# Patient Record
Sex: FEMALE | Race: WHITE
[De-identification: names, ages, dates, MRNs, and addresses within clinical notes are randomized per-mention and may not be internally consistent; named-entity substitution may affect disease eponyms.]

---

## 2020-12-08 ENCOUNTER — HOSPITAL ENCOUNTER (OUTPATIENT)
Dept: HOSPITAL 96 - M.CT | Age: 50
End: 2020-12-08
Attending: INTERNAL MEDICINE
Payer: COMMERCIAL

## 2020-12-08 VITALS — SYSTOLIC BLOOD PRESSURE: 140 MMHG | DIASTOLIC BLOOD PRESSURE: 82 MMHG

## 2020-12-08 VITALS — BODY MASS INDEX: 27.28 KG/M2 | HEIGHT: 68 IN | WEIGHT: 180 LBS

## 2020-12-08 VITALS — SYSTOLIC BLOOD PRESSURE: 134 MMHG | DIASTOLIC BLOOD PRESSURE: 78 MMHG

## 2020-12-08 VITALS — DIASTOLIC BLOOD PRESSURE: 78 MMHG | SYSTOLIC BLOOD PRESSURE: 123 MMHG

## 2020-12-08 VITALS — SYSTOLIC BLOOD PRESSURE: 123 MMHG | DIASTOLIC BLOOD PRESSURE: 72 MMHG

## 2020-12-08 DIAGNOSIS — Z88.1: ICD-10-CM

## 2020-12-08 DIAGNOSIS — R07.9: Primary | ICD-10-CM

## 2020-12-08 DIAGNOSIS — Z88.5: ICD-10-CM

## 2020-12-08 DIAGNOSIS — Z88.6: ICD-10-CM

## 2020-12-08 LAB
BUN SERPL-MCNC: 32 MG/DL (ref 7–18)
CREAT SERPL-MCNC: 0.8 MG/DL (ref 0.6–1.3)

## 2020-12-11 ENCOUNTER — HOSPITAL ENCOUNTER (OUTPATIENT)
Dept: HOSPITAL 96 - M.CL | Age: 50
Discharge: HOME | End: 2020-12-11
Attending: INTERNAL MEDICINE
Payer: COMMERCIAL

## 2020-12-11 VITALS — SYSTOLIC BLOOD PRESSURE: 117 MMHG | DIASTOLIC BLOOD PRESSURE: 71 MMHG

## 2020-12-11 DIAGNOSIS — R55: Primary | ICD-10-CM

## 2020-12-11 DIAGNOSIS — Z79.899: ICD-10-CM

## 2020-12-11 DIAGNOSIS — Z79.01: ICD-10-CM

## 2020-12-11 DIAGNOSIS — R00.1: ICD-10-CM

## 2020-12-11 DIAGNOSIS — Z88.8: ICD-10-CM

## 2020-12-11 DIAGNOSIS — Z98.890: ICD-10-CM

## 2020-12-15 NOTE — CARD
[address]  [address]                    CARDIAC CATH REPORT           
_______________________________________________________________________________
 
Name:       CHRISSY BUTCHER                Room:                      REG CLI 
M.R.#:  [id number]      Account #:      [id number]  
Admission:  12/11/20     Attend Phys:    Derek Kowalski MD
Discharge:               Date of Birth:  [birth year]  
         Report #: [id number]
                                                                     [id number]  
_______________________________________________________________________________
THIS REPORT FOR:  
 
cc:  Kirit Leblanc Russell J. DO                                               ~
     Derek Kowalski MD [facility]     
 
DATE OF SERVICE:  12/11/2020
 
 
PROCEDURE:  Implantable loop recorder placement.
 
INDICATION:  Bradycardia and near syncope.
 
DESCRIPTION OF PROCEDURE:  After informed consent was obtained, the patient was
placed on the gurney in the cardiac holding area.  The anterior chest was
prepped and draped in sterile fashion.  The third intercostal space, left of
sternum was anesthetized with 1% lidocaine.  Once the patient was adequately
anesthetized, an incision was made in the third intercostal space.  The
Biotronik implantable loop recorder was placed utilizing the designated injector
without complication.  The skin incision was then closed with Dermabond.  The
patient tolerated the procedure well and without complication.
 
Implanted device is a BioMonitor IIIm, serial #[id number].
 
IMPRESSION:
1.  Bradycardia and near syncope.
2.  Successful placement of an implantable loop recorder as outlined above.
 
RECOMMENDATIONS:  Followup site check in 1 week.
 
 
 
 
 
 
 
 
 
 
 
 
 
 
 
<ELECTRONICALLY SIGNED>
                                        By:  Derek Kowalski MD, [facility]   
12/15/20     0903
D: 12/11/20 1704_______________________________________
T: 12/11/20 1814Derek Kowalski MD, FACC      /nt

## 2021-09-30 ENCOUNTER — HOSPITAL ENCOUNTER (EMERGENCY)
Dept: HOSPITAL 96 - M.ERS | Age: 51
Discharge: HOME | End: 2021-09-30
Payer: COMMERCIAL

## 2021-09-30 VITALS — BODY MASS INDEX: 25.61 KG/M2 | WEIGHT: 169.01 LBS | HEIGHT: 68 IN

## 2021-09-30 VITALS — DIASTOLIC BLOOD PRESSURE: 72 MMHG | SYSTOLIC BLOOD PRESSURE: 101 MMHG

## 2021-09-30 DIAGNOSIS — Z90.49: ICD-10-CM

## 2021-09-30 DIAGNOSIS — Z88.8: ICD-10-CM

## 2021-09-30 DIAGNOSIS — Z79.1: ICD-10-CM

## 2021-09-30 DIAGNOSIS — Z88.1: ICD-10-CM

## 2021-09-30 DIAGNOSIS — Z88.5: ICD-10-CM

## 2021-09-30 DIAGNOSIS — Z98.890: ICD-10-CM

## 2021-09-30 DIAGNOSIS — Z79.891: ICD-10-CM

## 2021-09-30 DIAGNOSIS — K59.00: Primary | ICD-10-CM

## 2021-09-30 DIAGNOSIS — Z79.899: ICD-10-CM

## 2021-09-30 DIAGNOSIS — Z91.018: ICD-10-CM

## 2021-09-30 DIAGNOSIS — Z88.6: ICD-10-CM

## 2021-09-30 DIAGNOSIS — R10.84: ICD-10-CM

## 2021-09-30 LAB
ABSOLUTE BASOPHILS: 0 THOU/UL (ref 0–0.2)
ABSOLUTE EOSINOPHILS: 0.1 THOU/UL (ref 0–0.7)
ABSOLUTE MONOCYTES: 0.3 THOU/UL (ref 0–1.2)
ALBUMIN SERPL-MCNC: 3.3 G/DL (ref 3.4–5)
ALP SERPL-CCNC: 88 U/L (ref 46–116)
ALT SERPL-CCNC: 27 U/L (ref 30–65)
ANION GAP SERPL CALC-SCNC: 4 MMOL/L (ref 7–16)
AST SERPL-CCNC: 22 U/L (ref 15–37)
BASOPHILS NFR BLD AUTO: 0.7 %
BILIRUB SERPL-MCNC: 0.1 MG/DL
BILIRUB UR-MCNC: NEGATIVE MG/DL
BUN SERPL-MCNC: 26 MG/DL (ref 7–18)
CALCIUM SERPL-MCNC: 8.4 MG/DL (ref 8.5–10.1)
CHLORIDE SERPL-SCNC: 106 MMOL/L (ref 98–107)
CO2 SERPL-SCNC: 30 MMOL/L (ref 21–32)
COLOR UR: YELLOW
CREAT SERPL-MCNC: 0.9 MG/DL (ref 0.6–1.3)
EOSINOPHIL NFR BLD: 1.9 %
GLUCOSE SERPL-MCNC: 93 MG/DL (ref 70–99)
GRANULOCYTES NFR BLD MANUAL: 65.9 %
HCT VFR BLD CALC: 31 % (ref 37–47)
HGB BLD-MCNC: 10 GM/DL (ref 12–15)
KETONES UR STRIP-MCNC: NEGATIVE MG/DL
LIPASE: 98 U/L (ref 73–393)
LYMPHOCYTES # BLD: 1.1 THOU/UL (ref 0.8–5.3)
LYMPHOCYTES NFR BLD AUTO: 25.6 %
MCH RBC QN AUTO: 25.3 PG (ref 26–34)
MCHC RBC AUTO-ENTMCNC: 32.4 G/DL (ref 28–37)
MCV RBC: 78.1 FL (ref 80–100)
MONOCYTES NFR BLD: 5.9 %
MPV: 7.2 FL. (ref 7.2–11.1)
NEUTROPHILS # BLD: 2.9 THOU/UL (ref 1.6–8.1)
NUCLEATED RBCS: 0 /100WBC
PLATELET COUNT*: 210 THOU/UL (ref 150–400)
POTASSIUM SERPL-SCNC: 4.1 MMOL/L (ref 3.5–5.1)
PROT SERPL-MCNC: 6.4 G/DL (ref 6.4–8.2)
PROT UR QL STRIP: NEGATIVE
RBC # BLD AUTO: 3.96 MIL/UL (ref 4.2–5)
RBC # UR STRIP: NEGATIVE /UL
RDW-CV: 15.7 % (ref 10.5–14.5)
SODIUM SERPL-SCNC: 140 MMOL/L (ref 136–145)
SP GR UR STRIP: 1.01 (ref 1–1.03)
URINE CLARITY: CLEAR
URINE GLUCOSE-RANDOM: NEGATIVE
URINE LEUKOCYTES-REFLEX: NEGATIVE
URINE NITRITE-REFLEX: NEGATIVE
UROBILINOGEN UR STRIP-ACNC: 0.2 E.U./DL (ref 0.2–1)
WBC # BLD AUTO: 4.4 THOU/UL (ref 4–11)

## 2022-01-24 ENCOUNTER — HOSPITAL ENCOUNTER (EMERGENCY)
Dept: HOSPITAL 96 - M.ERS | Age: 52
Discharge: HOME | End: 2022-01-24
Payer: COMMERCIAL

## 2022-01-24 VITALS — WEIGHT: 173 LBS | BODY MASS INDEX: 26.22 KG/M2 | HEIGHT: 68 IN

## 2022-01-24 VITALS — SYSTOLIC BLOOD PRESSURE: 96 MMHG | DIASTOLIC BLOOD PRESSURE: 56 MMHG

## 2022-01-24 DIAGNOSIS — Z88.1: ICD-10-CM

## 2022-01-24 DIAGNOSIS — Z79.899: ICD-10-CM

## 2022-01-24 DIAGNOSIS — Z91.018: ICD-10-CM

## 2022-01-24 DIAGNOSIS — Z98.890: ICD-10-CM

## 2022-01-24 DIAGNOSIS — Z96.651: ICD-10-CM

## 2022-01-24 DIAGNOSIS — Z90.49: ICD-10-CM

## 2022-01-24 DIAGNOSIS — Z20.822: ICD-10-CM

## 2022-01-24 DIAGNOSIS — R07.89: Primary | ICD-10-CM

## 2022-01-24 DIAGNOSIS — Z88.5: ICD-10-CM

## 2022-01-24 DIAGNOSIS — Z88.6: ICD-10-CM

## 2022-01-24 DIAGNOSIS — R11.0: ICD-10-CM

## 2022-01-24 DIAGNOSIS — R06.02: ICD-10-CM

## 2022-01-24 DIAGNOSIS — Z98.84: ICD-10-CM

## 2022-01-24 LAB
ABSOLUTE BASOPHILS: 0.1 THOU/UL (ref 0–0.2)
ABSOLUTE EOSINOPHILS: 0.1 THOU/UL (ref 0–0.7)
ABSOLUTE MONOCYTES: 0.3 THOU/UL (ref 0–1.2)
ALBUMIN SERPL-MCNC: 3.4 G/DL (ref 3.4–5)
ALP SERPL-CCNC: 80 U/L (ref 46–116)
ALT SERPL-CCNC: 25 U/L (ref 30–65)
ANION GAP SERPL CALC-SCNC: 8 MMOL/L (ref 7–16)
AST SERPL-CCNC: 25 U/L (ref 15–37)
BASOPHILS NFR BLD AUTO: 1.2 %
BILIRUB SERPL-MCNC: 0.2 MG/DL
BUN SERPL-MCNC: 31 MG/DL (ref 7–18)
CALCIUM SERPL-MCNC: 8.4 MG/DL (ref 8.5–10.1)
CHLORIDE SERPL-SCNC: 103 MMOL/L (ref 98–107)
CO2 SERPL-SCNC: 28 MMOL/L (ref 21–32)
CREAT SERPL-MCNC: 0.9 MG/DL (ref 0.6–1.3)
EOSINOPHIL NFR BLD: 1.8 %
GLUCOSE SERPL-MCNC: 92 MG/DL (ref 70–99)
GRANULOCYTES NFR BLD MANUAL: 62.1 %
HCT VFR BLD CALC: 31.4 % (ref 37–47)
HGB BLD-MCNC: 10 GM/DL (ref 12–15)
LIPASE: 113 U/L (ref 73–393)
LYMPHOCYTES # BLD: 1.5 THOU/UL (ref 0.8–5.3)
LYMPHOCYTES NFR BLD AUTO: 28.7 %
MAGNESIUM SERPL-MCNC: 1.9 MG/DL (ref 1.8–2.4)
MCH RBC QN AUTO: 23.7 PG (ref 26–34)
MCHC RBC AUTO-ENTMCNC: 31.9 G/DL (ref 28–37)
MCV RBC: 74.3 FL (ref 80–100)
MONOCYTES NFR BLD: 6.2 %
MPV: 7.8 FL. (ref 7.2–11.1)
NEUTROPHILS # BLD: 3.3 THOU/UL (ref 1.6–8.1)
NT-PRO BRAIN NAT PEPTIDE: 97 PG/ML (ref ?–300)
NUCLEATED RBCS: 0 /100WBC
PLATELET COUNT*: 263 THOU/UL (ref 150–400)
POTASSIUM SERPL-SCNC: 3.9 MMOL/L (ref 3.5–5.1)
PROT SERPL-MCNC: 6.4 G/DL (ref 6.4–8.2)
RBC # BLD AUTO: 4.23 MIL/UL (ref 4.2–5)
RDW-CV: 17.6 % (ref 10.5–14.5)
SODIUM SERPL-SCNC: 139 MMOL/L (ref 136–145)
WBC # BLD AUTO: 5.3 THOU/UL (ref 4–11)

## 2022-01-25 NOTE — EKG
[place] [address]
Phone:  [phone]                     ELECTROCARDIOGRAM REPORT      
_______________________________________________________________________________
 
Name:         CHRISSY BUTCHER ELAINA               [facility]                     [facility]    [id number]     Account #:     [id number]  
Admission:    22    Attend Phys:                     
Discharge:    22    Date of Birth: [birth year]  
Date of Service: 22 [facility]  Report #:      [id number]
        [id number]
_______________________________________________________________________________
THIS REPORT FOR:  //name//                      
 
                         [facility] ED
                                       
Test Date:    2022               Test Time:    14:22:22
Pat Name:     CHRISSY BUTCHER            Department:   
Patient ID:   [id number]            Room:          
Gender:                               Technician:   
:          1970               Requested By: Bruce Domingo
Order Number: [id number] MD:   Tom Lee
                                 Measurements
Intervals                              Axis          
Rate:         72                       P:            62
PA:           161                      QRS:          9
QRSD:         98                       T:            47
QT:           446                                    
QTc:          489                                    
                           Interpretive Statements
Sinus rhythm
Low voltage, precordial leads
Borderline prolonged QT interval
Baseline wander in lead(s) V6
Compared to ECG 2022 11:21:47
Low QRS voltage now present
Electronically Signed On 2022 15:37:29 CST by Tom Lee
[email or web address]
 
 
 
 
 
 
 
 
 
 
 
 
 
 
 
 
 
 
  <ELECTRONICALLY SIGNED>
                                           By: Tom Lee MD, [facility]     
  22     1537
D: 22 1422   _____________________________________
T: 22 1422   Tom Lee MD, [facility]       /EPI

## 2022-01-25 NOTE — EKG
[place] [address]
Phone:  [phone]                     ELECTROCARDIOGRAM REPORT      
_______________________________________________________________________________
 
Name:         CHRISSY BUTCHER               Room:                     [facility]#:    [id number]     Account #:     [id number]  
Admission:    22    Attend Phys:                     
Discharge:    22    Date of Birth: [birth year]  
Date of Service: 22 1121  Report #:      [id number]
        [id number]
_______________________________________________________________________________
THIS REPORT FOR:  //name//                      
 
                         [facility] ED
                                       
Test Date:    2022               Test Time:    11:21:47
Pat Name:     CHRISSY BUTCHER            Department:   
Patient ID:   [id number]            Room:          
Gender:                               Technician:   AS
:          1970               Requested By: Bruce Domingo
Order Number: [id number] MD:   Tom Lee
                                 Measurements
Intervals                              Axis          
Rate:         66                       P:            46
NE:           144                      QRS:          9
QRSD:         113                      T:            38
QT:           436                                    
QTc:          457                                    
                           Interpretive Statements
Sinus rhythm
Borderline intraventricular conduction delay
No previous ECG available for comparison
Electronically Signed On 2022 14:12:03 CST by Tom Lee
[email or web address]
 
 
 
 
 
 
 
 
 
 
 
 
 
 
 
 
 
 
 
 
 
  <ELECTRONICALLY SIGNED>
                                           By: Tom Lee MD, [facility]     
  22     1412
D: 22   _____________________________________
T: 22 112   Tom Lee MD, FACC       /EPI